# Patient Record
Sex: MALE | Race: OTHER
[De-identification: names, ages, dates, MRNs, and addresses within clinical notes are randomized per-mention and may not be internally consistent; named-entity substitution may affect disease eponyms.]

---

## 2020-05-28 ENCOUNTER — HOSPITAL ENCOUNTER (EMERGENCY)
Dept: HOSPITAL 72 - EMR | Age: 55
Discharge: HOME | End: 2020-05-28
Payer: MEDICAID

## 2020-05-28 VITALS
BODY MASS INDEX: 27.35 KG/M2 | HEIGHT: 75 IN | WEIGHT: 220 LBS | DIASTOLIC BLOOD PRESSURE: 92 MMHG | SYSTOLIC BLOOD PRESSURE: 146 MMHG

## 2020-05-28 VITALS — DIASTOLIC BLOOD PRESSURE: 92 MMHG | SYSTOLIC BLOOD PRESSURE: 146 MMHG

## 2020-05-28 DIAGNOSIS — I10: ICD-10-CM

## 2020-05-28 DIAGNOSIS — M54.5: ICD-10-CM

## 2020-05-28 DIAGNOSIS — F16.10: ICD-10-CM

## 2020-05-28 DIAGNOSIS — T14.90XA: Primary | ICD-10-CM

## 2020-05-28 DIAGNOSIS — M62.82: ICD-10-CM
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
## 2020-05-28 DIAGNOSIS — M25.552: ICD-10-CM

## 2020-05-28 DIAGNOSIS — F17.200: ICD-10-CM

## 2020-05-28 DIAGNOSIS — R60.0: ICD-10-CM

## 2020-05-28 DIAGNOSIS — F14.10: ICD-10-CM
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
## 2020-05-28 DIAGNOSIS — F12.20: ICD-10-CM
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
## 2020-05-28 DIAGNOSIS — L03.90: ICD-10-CM

## 2020-05-28 LAB
ADD MANUAL DIFF: NO
ALBUMIN SERPL-MCNC: 3.5 G/DL (ref 3.4–5)
ALBUMIN/GLOB SERPL: 1 {RATIO} (ref 1–2.7)
ALP SERPL-CCNC: 63 U/L (ref 46–116)
ALT SERPL-CCNC: 72 U/L (ref 12–78)
ANION GAP SERPL CALC-SCNC: 9 MMOL/L (ref 5–15)
APPEARANCE UR: CLEAR
APTT BLD: 23 SEC (ref 23–33)
APTT PPP: (no result) S
AST SERPL-CCNC: 40 U/L (ref 15–37)
BASOPHILS NFR BLD AUTO: 1.7 % (ref 0–2)
BILIRUB SERPL-MCNC: 0.2 MG/DL (ref 0.2–1)
BUN SERPL-MCNC: 9 MG/DL (ref 7–18)
CALCIUM SERPL-MCNC: 8.6 MG/DL (ref 8.5–10.1)
CHLORIDE SERPL-SCNC: 105 MMOL/L (ref 98–107)
CK SERPL-CCNC: 830 U/L (ref 26–308)
CO2 SERPL-SCNC: 28 MMOL/L (ref 21–32)
CREAT SERPL-MCNC: 1 MG/DL (ref 0.55–1.3)
EOSINOPHIL NFR BLD AUTO: 1.1 % (ref 0–3)
ERYTHROCYTE [DISTWIDTH] IN BLOOD BY AUTOMATED COUNT: 13 % (ref 11.6–14.8)
GLOBULIN SER-MCNC: 3.6 G/DL
GLUCOSE UR STRIP-MCNC: NEGATIVE MG/DL
HCT VFR BLD CALC: 43.6 % (ref 42–52)
HGB BLD-MCNC: 14.4 G/DL (ref 14.2–18)
INR PPP: 0.9 (ref 0.9–1.1)
KETONES UR QL STRIP: NEGATIVE
LEUKOCYTE ESTERASE UR QL STRIP: NEGATIVE
LYMPHOCYTES NFR BLD AUTO: 19 % (ref 20–45)
MCV RBC AUTO: 102 FL (ref 80–99)
MONOCYTES NFR BLD AUTO: 7.2 % (ref 1–10)
NEUTROPHILS NFR BLD AUTO: 71 % (ref 45–75)
NITRITE UR QL STRIP: NEGATIVE
PH UR STRIP: 7 [PH] (ref 4.5–8)
PLATELET # BLD: 277 K/UL (ref 150–450)
POTASSIUM SERPL-SCNC: 3.7 MMOL/L (ref 3.5–5.1)
PROT UR QL STRIP: NEGATIVE
RBC # BLD AUTO: 4.28 M/UL (ref 4.7–6.1)
SODIUM SERPL-SCNC: 142 MMOL/L (ref 136–145)
SP GR UR STRIP: 1 (ref 1–1.03)
UROBILINOGEN UR-MCNC: NORMAL MG/DL (ref 0–1)
WBC # BLD AUTO: 8.3 K/UL (ref 4.8–10.8)

## 2020-05-28 PROCEDURE — 84484 ASSAY OF TROPONIN QUANT: CPT

## 2020-05-28 PROCEDURE — 93971 EXTREMITY STUDY: CPT

## 2020-05-28 PROCEDURE — 85610 PROTHROMBIN TIME: CPT

## 2020-05-28 PROCEDURE — 80053 COMPREHEN METABOLIC PANEL: CPT

## 2020-05-28 PROCEDURE — 85025 COMPLETE CBC W/AUTO DIFF WBC: CPT

## 2020-05-28 PROCEDURE — 73590 X-RAY EXAM OF LOWER LEG: CPT

## 2020-05-28 PROCEDURE — 96375 TX/PRO/DX INJ NEW DRUG ADDON: CPT

## 2020-05-28 PROCEDURE — 72192 CT PELVIS W/O DYE: CPT

## 2020-05-28 PROCEDURE — 82550 ASSAY OF CK (CPK): CPT

## 2020-05-28 PROCEDURE — 87040 BLOOD CULTURE FOR BACTERIA: CPT

## 2020-05-28 PROCEDURE — 96365 THER/PROPH/DIAG IV INF INIT: CPT

## 2020-05-28 PROCEDURE — 83735 ASSAY OF MAGNESIUM: CPT

## 2020-05-28 PROCEDURE — 83880 ASSAY OF NATRIURETIC PEPTIDE: CPT

## 2020-05-28 PROCEDURE — 72131 CT LUMBAR SPINE W/O DYE: CPT

## 2020-05-28 PROCEDURE — 93005 ELECTROCARDIOGRAM TRACING: CPT

## 2020-05-28 PROCEDURE — 99284 EMERGENCY DEPT VISIT MOD MDM: CPT

## 2020-05-28 PROCEDURE — 70450 CT HEAD/BRAIN W/O DYE: CPT

## 2020-05-28 PROCEDURE — 81003 URINALYSIS AUTO W/O SCOPE: CPT

## 2020-05-28 PROCEDURE — 96361 HYDRATE IV INFUSION ADD-ON: CPT

## 2020-05-28 PROCEDURE — 73552 X-RAY EXAM OF FEMUR 2/>: CPT

## 2020-05-28 PROCEDURE — 85730 THROMBOPLASTIN TIME PARTIAL: CPT

## 2020-05-28 PROCEDURE — 83605 ASSAY OF LACTIC ACID: CPT

## 2020-05-28 PROCEDURE — 80307 DRUG TEST PRSMV CHEM ANLYZR: CPT

## 2020-05-28 PROCEDURE — 71045 X-RAY EXAM CHEST 1 VIEW: CPT

## 2020-05-28 PROCEDURE — 36415 COLL VENOUS BLD VENIPUNCTURE: CPT
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
## 2020-05-28 NOTE — DIAGNOSTIC IMAGING REPORT
EXAM: CT CT Head no Contrast

 

INDICATION:  Trauma with head pain.

 

TECHNIQUE: 

Axial images of the brain were obtained with subsequent sagittal and coronal

reformats.

 

All CT scans at this facility are performed using dose modulation techniques as

appropriate to a performed exam including the following: automated exposure control

with  adjustment of the mA and/or kV according to patient size.

 

COMPARISON STUDY:   None.

 

RADIATION DOSE:  

CTDIvol:   53.4 mGy

DLP:   1045.5 mGy-cm

Dose information generated by the CT scanner is available in PACS.

 

FINDINGS:

 

There is normal symmetry and normal gray-white differentiation. There is no acute

large territory cortical infarct, hemorrhage, mass effect or shift. Ventricles and

cisterns as well as brainstem and posterior fossa appear unremarkable. The sellar

region is normal.  Sinuses, mastoid air cells and bony calvarium appear intact.

 

IMPRESSION:

 

NO ACUTE INTRACRANIAL ABNORMALITY.

## 2020-05-28 NOTE — DIAGNOSTIC IMAGING REPORT
EXAM: ULTRASOUND Venous Duplex Lower Ext Uni

 

CLINICAL HISTORY: Leg pain and edema.

 

COMPARISON:  None

 

TECHNIQUE:  Doppler examination include grayscale images obtained with and without

compression, and color and spectral doppler analysis.

 

FINDINGS:  

 

Doppler examination shows normal spontaneity, phasicity, compressibility in the left

lower extremity. There is no thrombus identified by grayscale. Normal color and

spectral flow is identified. There is no evidence of valvular incompetency or

insufficiency.

 

 

IMPRESSION:

 

UNREMARKABLE VENOUS DUPLEX.

## 2020-05-28 NOTE — DIAGNOSTIC IMAGING REPORT
EXAM: CT CT L Spine no Contrast

 

CLINICAL HISTORY: Trauma with back pain.

 

TECHNIQUE:  Axial images obtained through the lumbar spine with subsequent sagittal

and coronal reformat images.

 

All CT scans at this facility are performed using dose modulation techniques as

appropriate to a performed exam including the following: automated exposure control

with  adjustment of the mA and/or kV according to patient size.

 

RADIATION DOSE:  

CTDIvol:   14.1 mGy

DLP:   428.7 mGy-cm

Dose information generated by the CT scanner is available in PACS.

 

COMPARISON:  None

 

FINDINGS:  

 

There is anatomic alignment. Vertebral bodies are intact without compression

deformity. There is no fracture, bony lesions or erosions. Diffuse spondylosis is

noted with multilevel disc space narrowing and bridging osteophyte formation. Vacuum

disc identified at L3-4 and L5-S1. There is no paraspinal soft tissue abnormality.

 

IMPRESSION:

 

NO ACUTE FRACTURE OR MALALIGNMENT. 

 

MILD TO MODERATE DIFFUSE SPONDYLOSIS.

## 2020-05-28 NOTE — DIAGNOSTIC IMAGING REPORT
Procedure: XRAY Chest 1v

Reason for study: Trauma with chest pain

 

Comparison films:  None.

 

FINDINGS:

 

A single one view chest is obtained. Vascularity is normal. Mild hazy densities lung

bases likely atelectasis. Cardiac and mediastinal silhouette are within normal

limits. CP angles are sharp.  The bony thorax appear unremarkable.

 

IMPRESSION:  

 

Mild bibasilar atelectasis.

## 2020-05-28 NOTE — DIAGNOSTIC IMAGING REPORT
EXAM: X-RAY XRAY Femur 2v L

 

CLINICAL HISTORY: Trauma with leg pain. 

 

COMPARISON:  None

 

FINDINGS:  Total of 2 views of the left femur were obtained. 

 

Radiographs are limited. There is no fracture, bony lesions or erosions. Mild

degenerative changes noted at both the hip and the knee. Surrounding soft tissue is

normal.

 

IMPRESSION:

 

MILD DEGENERATIVE CHANGES. NO FRACTURE IN THE PRESENTED IMAGES.

## 2020-05-28 NOTE — DIAGNOSTIC IMAGING REPORT
EXAM: CT CT Pelvis no Contrast

 

INDICATION: Trauma with pelvic pain.

 

COMPARISON: None

 

TECHNIQUE:  Axial images were obtained through the pelvis without intravenous

contrast. Sagittal and coronal reformats are generated. 

 

All CT scans at this facility are performed using dose modulation techniques as

appropriate to a performed exam including the following: automated exposure control

with  adjustment of the mA and/or kV according to patient size.

 

RADIATION DOSE:  

CTDIvol:   11.4 mGy

DLP:   358.3 mGy-cm

Dose information generated by the CT scanner is available in PACS.

 

FINDINGS: 

 

Small bowel loops are nondistended. Mild increased stool lucencies noted in the

colon. The appendix is not visualized. There is no free fluid or free air.  No

pathologic adenopathy demonstrated.  Urinary bladder appears unremarkable.  

 

There are degenerative changes noted in the lower lumbar spine, involving both SI

joints as well as degenerative changes in both hips. Mild degenerative changes also

noted at the pubic symphysis. There is no fracture or malalignment demonstrated.

Surrounding soft tissues unremarkable.

 

IMPRESSION: 

 

NO ACUTE FRACTURE OR MALALIGNMENT.

 

DEGENERATIVE CHANGES NOTED IN THE LOWER LUMBAR SPINE, SI JOINTS AND BOTH HIPS.

## 2020-05-28 NOTE — DIAGNOSTIC IMAGING REPORT
EXAM: X-RAY XRAY Leg Lower Tib Fib 2v L

 

CLINICAL HISTORY: Trauma with leg pain. 

 

COMPARISON:  None

 

FINDINGS:  Total of 2 views of the left tibia and fibula were obtained. 

 

Alignment is anatomic. There is no fracture, bony lesions or erosions. Degenerative

changes of the knee noted. Surrounding soft tissue is normal.

 

IMPRESSION:

 

NO FRACTURE.